# Patient Record
Sex: FEMALE | NOT HISPANIC OR LATINO | Employment: FULL TIME | URBAN - METROPOLITAN AREA
[De-identification: names, ages, dates, MRNs, and addresses within clinical notes are randomized per-mention and may not be internally consistent; named-entity substitution may affect disease eponyms.]

---

## 2022-07-05 ENCOUNTER — APPOINTMENT (OUTPATIENT)
Dept: CT IMAGING | Facility: CLINIC | Age: 32
End: 2022-07-05
Attending: EMERGENCY MEDICINE
Payer: COMMERCIAL

## 2022-07-05 ENCOUNTER — HOSPITAL ENCOUNTER (EMERGENCY)
Facility: CLINIC | Age: 32
Discharge: HOME | End: 2022-07-06
Attending: EMERGENCY MEDICINE | Admitting: EMERGENCY MEDICINE
Payer: COMMERCIAL

## 2022-07-05 ENCOUNTER — APPOINTMENT (OUTPATIENT)
Dept: ULTRASOUND IMAGING | Facility: CLINIC | Age: 32
End: 2022-07-05
Attending: EMERGENCY MEDICINE
Payer: COMMERCIAL

## 2022-07-05 DIAGNOSIS — K63.89 EPIPLOIC APPENDAGITIS: ICD-10-CM

## 2022-07-05 DIAGNOSIS — N28.89 RIGHT KIDNEY MASS: ICD-10-CM

## 2022-07-05 DIAGNOSIS — R10.30 LOWER ABDOMINAL PAIN: ICD-10-CM

## 2022-07-05 LAB
ALBUMIN SERPL-MCNC: 3.6 G/DL (ref 3.4–5)
ALBUMIN UR-MCNC: NEGATIVE MG/DL
ALP SERPL-CCNC: 62 U/L (ref 40–150)
ALT SERPL W P-5'-P-CCNC: 19 U/L (ref 0–50)
ANION GAP SERPL CALCULATED.3IONS-SCNC: 8 MMOL/L (ref 3–14)
APPEARANCE UR: CLEAR
AST SERPL W P-5'-P-CCNC: 13 U/L (ref 0–45)
BASOPHILS # BLD AUTO: 0 10E3/UL (ref 0–0.2)
BASOPHILS NFR BLD AUTO: 0 %
BILIRUB SERPL-MCNC: 0.3 MG/DL (ref 0.2–1.3)
BILIRUB UR QL STRIP: NEGATIVE
BUN SERPL-MCNC: 12 MG/DL (ref 7–30)
CALCIUM SERPL-MCNC: 9.3 MG/DL (ref 8.5–10.1)
CHLORIDE BLD-SCNC: 106 MMOL/L (ref 94–109)
CO2 SERPL-SCNC: 25 MMOL/L (ref 20–32)
COLOR UR AUTO: YELLOW
CREAT SERPL-MCNC: 0.62 MG/DL (ref 0.52–1.04)
EOSINOPHIL # BLD AUTO: 0.1 10E3/UL (ref 0–0.7)
EOSINOPHIL NFR BLD AUTO: 1 %
ERYTHROCYTE [DISTWIDTH] IN BLOOD BY AUTOMATED COUNT: 12.4 % (ref 10–15)
GFR SERPL CREATININE-BSD FRML MDRD: >90 ML/MIN/1.73M2
GLUCOSE BLD-MCNC: 103 MG/DL (ref 70–99)
GLUCOSE UR STRIP-MCNC: NEGATIVE MG/DL
HCG SERPL QL: NEGATIVE
HCT VFR BLD AUTO: 37.7 % (ref 35–47)
HGB BLD-MCNC: 12.2 G/DL (ref 11.7–15.7)
HGB UR QL STRIP: ABNORMAL
HOLD SPECIMEN: NORMAL
IMM GRANULOCYTES # BLD: 0 10E3/UL
IMM GRANULOCYTES NFR BLD: 0 %
KETONES UR STRIP-MCNC: ABNORMAL MG/DL
LEUKOCYTE ESTERASE UR QL STRIP: ABNORMAL
LYMPHOCYTES # BLD AUTO: 2.5 10E3/UL (ref 0.8–5.3)
LYMPHOCYTES NFR BLD AUTO: 32 %
MCH RBC QN AUTO: 30.7 PG (ref 26.5–33)
MCHC RBC AUTO-ENTMCNC: 32.4 G/DL (ref 31.5–36.5)
MCV RBC AUTO: 95 FL (ref 78–100)
MONOCYTES # BLD AUTO: 0.6 10E3/UL (ref 0–1.3)
MONOCYTES NFR BLD AUTO: 7 %
MUCOUS THREADS #/AREA URNS LPF: PRESENT /LPF
NEUTROPHILS # BLD AUTO: 4.6 10E3/UL (ref 1.6–8.3)
NEUTROPHILS NFR BLD AUTO: 60 %
NITRATE UR QL: NEGATIVE
NRBC # BLD AUTO: 0 10E3/UL
NRBC BLD AUTO-RTO: 0 /100
PH UR STRIP: 5 [PH] (ref 5–7)
PLATELET # BLD AUTO: 310 10E3/UL (ref 150–450)
POTASSIUM BLD-SCNC: 3.6 MMOL/L (ref 3.4–5.3)
PROT SERPL-MCNC: 7.9 G/DL (ref 6.8–8.8)
RBC # BLD AUTO: 3.97 10E6/UL (ref 3.8–5.2)
RBC URINE: 2 /HPF
SODIUM SERPL-SCNC: 139 MMOL/L (ref 133–144)
SP GR UR STRIP: 1.03 (ref 1–1.03)
SQUAMOUS EPITHELIAL: 3 /HPF
UROBILINOGEN UR STRIP-MCNC: NORMAL MG/DL
WBC # BLD AUTO: 7.8 10E3/UL (ref 4–11)
WBC URINE: 2 /HPF

## 2022-07-05 PROCEDURE — 80053 COMPREHEN METABOLIC PANEL: CPT | Performed by: EMERGENCY MEDICINE

## 2022-07-05 PROCEDURE — 250N000011 HC RX IP 250 OP 636: Performed by: EMERGENCY MEDICINE

## 2022-07-05 PROCEDURE — 81001 URINALYSIS AUTO W/SCOPE: CPT | Performed by: EMERGENCY MEDICINE

## 2022-07-05 PROCEDURE — 96374 THER/PROPH/DIAG INJ IV PUSH: CPT

## 2022-07-05 PROCEDURE — 74176 CT ABD & PELVIS W/O CONTRAST: CPT

## 2022-07-05 PROCEDURE — 36415 COLL VENOUS BLD VENIPUNCTURE: CPT | Performed by: EMERGENCY MEDICINE

## 2022-07-05 PROCEDURE — 76856 US EXAM PELVIC COMPLETE: CPT

## 2022-07-05 PROCEDURE — 84703 CHORIONIC GONADOTROPIN ASSAY: CPT | Performed by: EMERGENCY MEDICINE

## 2022-07-05 PROCEDURE — 99285 EMERGENCY DEPT VISIT HI MDM: CPT | Mod: 25

## 2022-07-05 PROCEDURE — 85025 COMPLETE CBC W/AUTO DIFF WBC: CPT | Performed by: EMERGENCY MEDICINE

## 2022-07-05 RX ORDER — KETOROLAC TROMETHAMINE 30 MG/ML
30 INJECTION, SOLUTION INTRAMUSCULAR; INTRAVENOUS ONCE
Status: COMPLETED | OUTPATIENT
Start: 2022-07-05 | End: 2022-07-05

## 2022-07-05 RX ADMIN — KETOROLAC TROMETHAMINE 30 MG: 30 INJECTION, SOLUTION INTRAMUSCULAR at 23:06

## 2022-07-05 ASSESSMENT — ENCOUNTER SYMPTOMS
CONSTIPATION: 0
ABDOMINAL PAIN: 1

## 2022-07-05 NOTE — ED TRIAGE NOTES
Lower abd pain x1 week. Pain increases with urination and BM. No N/V/D. Last BM 20 mins pta. Has mirena.      Triage Assessment     Row Name 07/05/22 3121       Triage Assessment (Adult)    Airway WDL WDL       Cognitive/Neuro/Behavioral WDL    Cognitive/Neuro/Behavioral WDL WDL

## 2022-07-06 VITALS
HEART RATE: 76 BPM | SYSTOLIC BLOOD PRESSURE: 122 MMHG | DIASTOLIC BLOOD PRESSURE: 82 MMHG | RESPIRATION RATE: 12 BRPM | TEMPERATURE: 97.7 F | WEIGHT: 170 LBS | OXYGEN SATURATION: 100 %

## 2022-07-06 RX ORDER — HYDROCODONE BITARTRATE AND ACETAMINOPHEN 5; 325 MG/1; MG/1
1 TABLET ORAL EVERY 6 HOURS PRN
Qty: 8 TABLET | Refills: 0 | Status: SHIPPED | OUTPATIENT
Start: 2022-07-06 | End: 2022-07-09

## 2022-07-06 NOTE — ED NOTES
Patient was endorsed to me at end of shift by Dr. Mejia.  Patient presented with abdominal pain, with normal labs and ultrasound.  CT was pending at time of signout.  CT shows epiploic appendagitis as well as a masslike area in the right kidney.  I updated patient on results and recommended close primary care follow-up for these findings.  Dr. Mejia wrote patient for pain medications which she would like to take.  We discussed return precautions and again reiterated follow-up with primary care physician.     Edilberto Early MD  07/06/22 0041

## 2022-07-06 NOTE — ED PROVIDER NOTES
History   Chief Complaint:  Abdominal Pain       The history is provided by the patient.      Marci Adams is a 32 year old female with history of gastritis who presents with 1 week of constant lower abdominal pain. She notes the pain worsens whenever she walks or has a bowel movement. She has been taking Tylenol and Aleve for pain management with no relief. She is not familiar with this pain. She is not concerned for a UTI, and has had an IUD in place since 6 years ago. She denies constipation, vaginal bleeding, or vaginal discharge. Her OB is Dr. Wilcox from [facility].     Review of Systems   Gastrointestinal: Positive for abdominal pain. Negative for constipation.   Genitourinary: Negative for vaginal bleeding and vaginal discharge.   All other systems reviewed and are negative.    Allergies:  No Known Allergies    Medications:  Bentyl     Past Medical History:     Major depressive disorder   NALLELY  Gastritis   Constipation   Preeclampsia   Biliary dyskinesia     Past Surgical History:    Cholecystectomy      Family History:    Brother - depression, suicide   Father - breast cancer, HTN   Mother - HLD     Social History:  The patient presents to the ED alone via private vehicle   Hx of alcohol use   OB: Dr. Wilcox     Physical Exam     Patient Vitals for the past 24 hrs:   BP Temp Temp src Pulse Resp SpO2 Weight   07/05/22 1625 127/78 97.7  F (36.5  C) Temporal 83 12 98 % 77.1 kg (170 lb)       Physical Exam  Constitutional:       Appearance: She is well-developed.   HENT:      Mouth/Throat:      Mouth: Mucous membranes are moist.      Pharynx: Oropharynx is clear. No oropharyngeal exudate or posterior oropharyngeal erythema.   Eyes:      General: No scleral icterus.     Conjunctiva/sclera: Conjunctivae normal.      Pupils: Pupils are equal, round, and reactive to light.   Cardiovascular:      Rate and Rhythm: Normal rate and regular rhythm.      Heart sounds: Normal heart sounds. No murmur heard.    No  friction rub. No gallop.   Pulmonary:      Effort: Pulmonary effort is normal. No respiratory distress.      Breath sounds: Normal breath sounds. No wheezing or rales.   Abdominal:      General: Bowel sounds are normal. There is no distension.      Palpations: Abdomen is soft. There is no mass.      Tenderness: There is abdominal tenderness. There is no right CVA tenderness, left CVA tenderness, guarding or rebound.      Comments: BLQ TTP and suprapubic TTP   Musculoskeletal:         General: Normal range of motion.   Skin:     General: Skin is warm and dry.      Capillary Refill: Capillary refill takes less than 2 seconds.      Findings: No rash.   Neurological:      Mental Status: She is alert.           Emergency Department Course   Imaging:  US Pelvic Complete with Transvaginal   Final Result   IMPRESSION:   1.  IUD in appropriate position within the endometrial canal.   2.  Small amount of free pelvic fluid.   3.  Exam otherwise unremarkable.               CT Abdomen Pelvis w/o Contrast    (Results Pending)     Report per radiology    Laboratory:  Labs Ordered and Resulted from Time of ED Arrival to Time of ED Departure   ROUTINE UA WITH MICROSCOPIC - Abnormal       Result Value    Color Urine Yellow      Appearance Urine Clear      Glucose Urine Negative      Bilirubin Urine Negative      Ketones Urine Trace (*)     Specific Gravity Urine 1.030      Blood Urine Trace (*)     pH Urine 5.0      Protein Albumin Urine Negative      Urobilinogen Urine Normal      Nitrite Urine Negative      Leukocyte Esterase Urine Small (*)     Mucus Urine Present (*)     RBC Urine 2      WBC Urine 2      Squamous Epithelials Urine 3 (*)    COMPREHENSIVE METABOLIC PANEL - Abnormal    Sodium 139      Potassium 3.6      Chloride 106      Carbon Dioxide (CO2) 25      Anion Gap 8      Urea Nitrogen 12      Creatinine 0.62      Calcium 9.3      Glucose 103 (*)     Alkaline Phosphatase 62      AST 13      ALT 19      Protein Total 7.9       Albumin 3.6      Bilirubin Total 0.3      GFR Estimate >90     HCG QUALITATIVE PREGNANCY - Normal    hCG Serum Qualitative Negative     CBC WITH PLATELETS AND DIFFERENTIAL    WBC Count 7.8      RBC Count 3.97      Hemoglobin 12.2      Hematocrit 37.7      MCV 95      MCH 30.7      MCHC 32.4      RDW 12.4      Platelet Count 310      % Neutrophils 60      % Lymphocytes 32      % Monocytes 7      % Eosinophils 1      % Basophils 0      % Immature Granulocytes 0      NRBCs per 100 WBC 0      Absolute Neutrophils 4.6      Absolute Lymphocytes 2.5      Absolute Monocytes 0.6      Absolute Eosinophils 0.1      Absolute Basophils 0.0      Absolute Immature Granulocytes 0.0      Absolute NRBCs 0.0        Emergency Department Course:    Reviewed:  I reviewed nursing notes, vitals and past medical history    Assessments:  2157 I obtained history and examined the patient as noted above.   2329 I rechecked the patient and updated.  0005 I rechecked the patient and explained findings. I discussed plan for discharge home.    Interventions:  Medications   ketorolac (TORADOL) injection 30 mg (30 mg Intravenous Given 7/5/22 2306)     Disposition:  Care of the patient was transferred to my colleague Dr. Early pending CT.     Impression & Plan   Medical Decision Making:  Patient presents today for evaluation of lower abdominal pain.  Pain has been 1 week waxing and waning intensity.  Exam does not show surgical abdomen.  She is tender in the bilateral lower quadrant more specifically in the suprapubic area.  She does not describe any concerns for STIs.  She denies any discharges or bleeding.  Labs are unremarkable.  We did do an ultrasound and it did not show anything either.  There is no signs of IUD malpositioning.  Given that ultrasound was unremarkable, we did do a CT scan.  Currently the results are pending.  Dr. Early will follow-up.  I discussed this with the patient and she is comfortable with the plan.  Patient is  currently waiting for CT results.  She will follow-up with her OB if the CT is negative.  She will have OB checked IUD.  Return precautions provided.  She can use limited quantity of Vicodin as needed at home.    Diagnosis:    ICD-10-CM    1. Lower abdominal pain  R10.30        Discharge Medications:  New Prescriptions    HYDROCODONE-ACETAMINOPHEN (NORCO) 5-325 MG TABLET    Take 1 tablet by mouth every 6 hours as needed for severe pain       Scribe Disclosure:  I, Mitch Orr, am serving as a scribe at 9:57 PM on 7/5/2022 to document services personally performed by Ema Mejia MD based on my observations and the provider's statements to me.        Ema Mejia MD  07/06/22 0021